# Patient Record
Sex: MALE | Race: BLACK OR AFRICAN AMERICAN
[De-identification: names, ages, dates, MRNs, and addresses within clinical notes are randomized per-mention and may not be internally consistent; named-entity substitution may affect disease eponyms.]

---

## 2019-03-21 ENCOUNTER — HOSPITAL ENCOUNTER (EMERGENCY)
Dept: HOSPITAL 25 - ED | Age: 34
Discharge: HOME | End: 2019-03-21
Payer: COMMERCIAL

## 2019-03-21 VITALS — SYSTOLIC BLOOD PRESSURE: 118 MMHG | DIASTOLIC BLOOD PRESSURE: 79 MMHG

## 2019-03-21 DIAGNOSIS — J40: Primary | ICD-10-CM

## 2019-03-21 DIAGNOSIS — R07.9: ICD-10-CM

## 2019-03-21 DIAGNOSIS — Z72.0: ICD-10-CM

## 2019-03-21 DIAGNOSIS — F41.9: ICD-10-CM

## 2019-03-21 DIAGNOSIS — R06.02: ICD-10-CM

## 2019-03-21 LAB
ALBUMIN SERPL BCG-MCNC: 4.7 G/DL (ref 3.2–5.2)
ALBUMIN/GLOB SERPL: 1.7 {RATIO} (ref 1–3)
ALP SERPL-CCNC: 64 U/L (ref 34–104)
ALT SERPL W P-5'-P-CCNC: 19 U/L (ref 7–52)
ANION GAP SERPL CALC-SCNC: 11 MMOL/L (ref 2–11)
AST SERPL-CCNC: 19 U/L (ref 13–39)
BASOPHILS # BLD AUTO: 0 10^3/UL (ref 0–0.2)
BUN SERPL-MCNC: 13 MG/DL (ref 6–24)
BUN/CREAT SERPL: 13.4 (ref 8–20)
CALCIUM SERPL-MCNC: 10 MG/DL (ref 8.6–10.3)
CHLORIDE SERPL-SCNC: 104 MMOL/L (ref 101–111)
EOSINOPHIL # BLD AUTO: 0.2 10^3/UL (ref 0–0.6)
GLOBULIN SER CALC-MCNC: 2.7 G/DL (ref 2–4)
GLUCOSE SERPL-MCNC: 92 MG/DL (ref 70–100)
HCO3 SERPL-SCNC: 24 MMOL/L (ref 22–32)
HCT VFR BLD AUTO: 45 % (ref 36–46)
HGB BLD-MCNC: 15.4 G/DL (ref 14–18)
LYMPHOCYTES # BLD AUTO: 1.4 10^3/UL (ref 1–4.8)
MCH RBC QN AUTO: 30 PG (ref 27–31)
MCHC RBC AUTO-ENTMCNC: 34 G/DL (ref 31–36)
MCV RBC AUTO: 88 FL (ref 80–94)
MONOCYTES # BLD AUTO: 1 10^3/UL (ref 0–0.8)
NEUTROPHILS # BLD AUTO: 9 10^3/UL (ref 1.5–7.7)
NRBC # BLD AUTO: 0 10^3/UL
NRBC BLD QL AUTO: 0.3
PLATELET # BLD AUTO: 203 10^3/UL (ref 150–450)
POTASSIUM SERPL-SCNC: 3.4 MMOL/L (ref 3.5–5)
PROT SERPL-MCNC: 7.4 G/DL (ref 6.4–8.9)
RBC # BLD AUTO: 5.13 10^6 /UL (ref 4.18–5.48)
SODIUM SERPL-SCNC: 139 MMOL/L (ref 135–145)
TROPONIN I SERPL-MCNC: 0 NG/ML (ref ?–0.04)
TSH SERPL-ACNC: 2.53 MCIU/ML (ref 0.34–5.6)
WBC # BLD AUTO: 11.6 10^3/UL (ref 3.5–10.8)

## 2019-03-21 PROCEDURE — 36415 COLL VENOUS BLD VENIPUNCTURE: CPT

## 2019-03-21 PROCEDURE — 93005 ELECTROCARDIOGRAM TRACING: CPT

## 2019-03-21 PROCEDURE — 85025 COMPLETE CBC W/AUTO DIFF WBC: CPT

## 2019-03-21 PROCEDURE — 84484 ASSAY OF TROPONIN QUANT: CPT

## 2019-03-21 PROCEDURE — 80053 COMPREHEN METABOLIC PANEL: CPT

## 2019-03-21 PROCEDURE — 85379 FIBRIN DEGRADATION QUANT: CPT

## 2019-03-21 PROCEDURE — 71045 X-RAY EXAM CHEST 1 VIEW: CPT

## 2019-03-21 PROCEDURE — 99283 EMERGENCY DEPT VISIT LOW MDM: CPT

## 2019-03-21 PROCEDURE — 84443 ASSAY THYROID STIM HORMONE: CPT
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
## 2019-12-03 ENCOUNTER — HOSPITAL ENCOUNTER (EMERGENCY)
Dept: HOSPITAL 25 - ED | Age: 34
Discharge: HOME | End: 2019-12-03
Payer: SELF-PAY

## 2019-12-03 VITALS — DIASTOLIC BLOOD PRESSURE: 73 MMHG | SYSTOLIC BLOOD PRESSURE: 123 MMHG

## 2019-12-03 DIAGNOSIS — F17.200: ICD-10-CM

## 2019-12-03 DIAGNOSIS — F41.9: Primary | ICD-10-CM

## 2019-12-03 PROCEDURE — 99283 EMERGENCY DEPT VISIT LOW MDM: CPT
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
## 2020-01-07 ENCOUNTER — HOSPITAL ENCOUNTER (EMERGENCY)
Dept: HOSPITAL 25 - ED | Age: 35
Discharge: HOME | End: 2020-01-07
Payer: SELF-PAY

## 2020-01-07 VITALS — SYSTOLIC BLOOD PRESSURE: 112 MMHG | DIASTOLIC BLOOD PRESSURE: 65 MMHG

## 2020-01-07 DIAGNOSIS — Z82.49: ICD-10-CM

## 2020-01-07 DIAGNOSIS — Z83.3: ICD-10-CM

## 2020-01-07 DIAGNOSIS — R07.89: ICD-10-CM

## 2020-01-07 DIAGNOSIS — F41.9: Primary | ICD-10-CM

## 2020-01-07 DIAGNOSIS — Z72.0: ICD-10-CM

## 2020-01-07 DIAGNOSIS — R06.02: ICD-10-CM

## 2020-01-07 DIAGNOSIS — Z83.2: ICD-10-CM

## 2020-01-07 LAB
ALBUMIN SERPL BCG-MCNC: 4.5 G/DL (ref 3.2–5.2)
ALBUMIN/GLOB SERPL: 1.8 {RATIO} (ref 1–3)
ALP SERPL-CCNC: 48 U/L (ref 34–104)
ALT SERPL W P-5'-P-CCNC: 16 U/L (ref 7–52)
ANION GAP SERPL CALC-SCNC: 17 MMOL/L (ref 2–11)
AST SERPL-CCNC: 21 U/L (ref 13–39)
BASOPHILS # BLD AUTO: 0 10^3/UL (ref 0–0.2)
BUN SERPL-MCNC: 18 MG/DL (ref 6–24)
BUN/CREAT SERPL: 18.4 (ref 8–20)
CALCIUM SERPL-MCNC: 9.8 MG/DL (ref 8.6–10.3)
CHLORIDE SERPL-SCNC: 104 MMOL/L (ref 101–111)
EOSINOPHIL # BLD AUTO: 0.2 10^3/UL (ref 0–0.6)
GLOBULIN SER CALC-MCNC: 2.5 G/DL (ref 2–4)
GLUCOSE SERPL-MCNC: 105 MG/DL (ref 70–100)
HCO3 SERPL-SCNC: 18 MMOL/L (ref 22–32)
HCT VFR BLD AUTO: 46 % (ref 42–52)
HGB BLD-MCNC: 15.9 G/DL (ref 14–18)
LYMPHOCYTES # BLD AUTO: 1.2 10^3/UL (ref 1–4.8)
MCH RBC QN AUTO: 31 PG (ref 27–31)
MCHC RBC AUTO-ENTMCNC: 35 G/DL (ref 31–36)
MCV RBC AUTO: 89 FL (ref 80–94)
MONOCYTES # BLD AUTO: 0.5 10^3/UL (ref 0–0.8)
NEUTROPHILS # BLD AUTO: 3 10^3/UL (ref 1.5–7.7)
NRBC # BLD AUTO: 0 10^3/UL
NRBC BLD QL AUTO: 0
PLATELET # BLD AUTO: 186 10^3/UL (ref 150–450)
POTASSIUM SERPL-SCNC: 3.4 MMOL/L (ref 3.5–5)
PROT SERPL-MCNC: 7 G/DL (ref 6.4–8.9)
RBC # BLD AUTO: 5.12 10^6 /UL (ref 4.18–5.48)
SODIUM SERPL-SCNC: 139 MMOL/L (ref 135–145)
TROPONIN I SERPL-MCNC: 0 NG/ML (ref ?–0.03)
TSH SERPL-ACNC: 1.56 MCIU/ML (ref 0.34–5.6)
WBC # BLD AUTO: 4.9 10^3/UL (ref 3.5–10.8)

## 2020-01-07 PROCEDURE — 84443 ASSAY THYROID STIM HORMONE: CPT

## 2020-01-07 PROCEDURE — 84484 ASSAY OF TROPONIN QUANT: CPT

## 2020-01-07 PROCEDURE — 85379 FIBRIN DEGRADATION QUANT: CPT

## 2020-01-07 PROCEDURE — 80053 COMPREHEN METABOLIC PANEL: CPT

## 2020-01-07 PROCEDURE — 96361 HYDRATE IV INFUSION ADD-ON: CPT

## 2020-01-07 PROCEDURE — 85025 COMPLETE CBC W/AUTO DIFF WBC: CPT

## 2020-01-07 PROCEDURE — 36415 COLL VENOUS BLD VENIPUNCTURE: CPT

## 2020-01-07 PROCEDURE — 96374 THER/PROPH/DIAG INJ IV PUSH: CPT

## 2020-01-07 PROCEDURE — 93005 ELECTROCARDIOGRAM TRACING: CPT

## 2020-01-07 PROCEDURE — 71046 X-RAY EXAM CHEST 2 VIEWS: CPT

## 2020-01-07 PROCEDURE — 99283 EMERGENCY DEPT VISIT LOW MDM: CPT
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
## 2020-01-07 NOTE — XMS REPORT
Continuity of Care Document (CCD)

 Created on:2019



Patient:Otf Stephens

Sex:Male

:1985

External Reference #:[id number]





Demographics







 Address  [address] [id number]

 

 Mobile Phone  [phone]

 

 Preferred Language  Unknown

 

 Marital Status   or 

 

 Rastafari Affiliation  Unknown

 

 Race  Unknown

 

 Ethnic Group   or 









Author







 Name  Blanche Sinha MD (transmitted by agent of provider Amanda Hernández)

 

 Address  [address], [address]



   [place] [address]









Problems







 Description

 

 No Information Available







Social History







 Type  Date  Description  Comments

 

 Birth Sex    Unknown  

 

 ETOH Use  2019  Occasionally consumes  3 drinks per week, no



     alcohol  binge drinking.

 

 Tobacco Use  Reviewed: 12  Patient is a current smoker,  1 pack per day



     smokes every day  

 

 Smoking Status  Reviewed: 19  Patient is a current smoker,  1 pack per 
day



     smokes every day  







Allergies, Adverse Reactions, Alerts







 Description

 

 No Known Drug Allergies







Medications







 Description

 

 No Active Medications







Immunizations







 CPT Code  Status  Date  Vaccine  Lot #

 

 [id number]  Given  2019  Tdap - Tetanus/Diptheria/Acellular Pertussis  [id number]

 

 [id number]  Given  2019  Influenza Virus Vaccine, Quadrivalent, Split,  
[id number]



       Preservative Free  







Vital Signs







 Date  Vital  Result  Comment

 

 2019  8:56am  Height  65 inches  5'5"









 Weight  145.00 lb  

 

 Heart Rate  80 /min  

 

 BP Systolic Sitting  115 mmHg  

 

 BP Diastolic Sitting  76 mmHg  

 

 Body Temperature  98.4 F  

 

 O2 % BldC Oximetry  98 %  

 

 BMI (Body Mass Index)  24.1 kg/m2  







Results







 Description

 

 No Information Available







Procedures







 Description

 

 No Information Available







Medical Devices







 Description

 

 No Information Available







Encounters







 Description

 

 No Information Available







Assessments







 Date  Code  Description  Provider

 

 2019  F41.9  Anxiety disorder, unspecified  Blanche Sinha MD

 

 2019  Z23  Encounter for immunization  Blanche Sinha MD

 

 2019  Z11.4  Encounter for screening for human immunodeficiency  Blanche Sinha MD



     virus [HIV]  

 

 2019  Z11.3  Encounter for screening for infections with a  Blanche Sinha MD



     predominantly sexual mode of transmission  

 

 2019  Z13.220  Encounter for screening for lipoid disorders  Blanche Sinha MD

 

 2019  Z72.0  Tobacco use  Blanche Sinha MD







Plan of Treatment

2019 - Blanche Sinha MDF41.9 Anxiety disorder, unspecifiedReferral:[facility]/CounselorFollow up:Follow up once per year 
for [id number] Encounter for [id number] Encounter for screening for 
human immunodeficiency virus [HIV]Z11.3 Encounter for screening for infections 
with a predominantly sexual mode of [id number] Encounter for screening 
for lipoid zisyqzjwvV02.0 Tobacco useComments:Please let us know if you need 
help quiting.



Functional Status







 Description

 

 No Information Available







Mental Status







 Description

 

 No Information Available







Referrals







 Refer to   Reason for Referral  Status  Appt Date

 

 [facility]  Anxiety, PTSD, used to follow up  Created  



   with [address] [address]

 

 [phone]